# Patient Record
Sex: MALE | Race: WHITE | NOT HISPANIC OR LATINO | URBAN - NONMETROPOLITAN AREA
[De-identification: names, ages, dates, MRNs, and addresses within clinical notes are randomized per-mention and may not be internally consistent; named-entity substitution may affect disease eponyms.]

---

## 2017-07-12 PROBLEM — H40.033: Noted: 2017-07-12

## 2017-07-12 PROBLEM — H25.813: Noted: 2017-07-12

## 2019-04-29 ENCOUNTER — IMPORTED ENCOUNTER (OUTPATIENT)
Dept: URBAN - NONMETROPOLITAN AREA CLINIC 1 | Facility: CLINIC | Age: 69
End: 2019-04-29

## 2019-04-29 PROCEDURE — 92014 COMPRE OPH EXAM EST PT 1/>: CPT

## 2019-04-29 NOTE — PATIENT DISCUSSION
Cataract(s)-Visually significant cataract OU .-Cataract(s) causing symptomatic impairment of visual function not correctable with a tolerable change in glasses or contact lenses lighting or non-operative means resulting in specific activity limitations and/or participation restrictions including but not limited to reading viewing television driving or meeting vocational or recreational needs. -Expectation is clearer vision and functional improvement in symptoms as well as reduced glare disability after cataract removal.-Order IOLMaster and OPD today. -Recommend  Traditional w/Toric IOL based on today's OPD testing and lifestyle questionnaire. -Discussed that will need greishabers to open the pupil enough to perform cataract surgery.-Discussed that patient does not dilate well so does not qualify for the LenSx.-All questions were answered regarding surgery including pre and post-op medications appointments activity restrictions and anesthetic usage.-The risks benefits and alternatives and special risk factors for the patient were discussed in detail including but not limited to: bleeding infection retinal detachment vitreous loss problems with the implant and possible need for additional surgery.-Although rare the possibility of complete vision loss was discussed.-The possible need for glasses post-operatively was discussed.-Order medical clearance exam based on history of HTN cholesterol-Patient elects to proceed with cataract surgery OD .  Will schedule at patient's convenience and re-evaluate OS  in the future.; 's Notes: Dr Clark Morning referral

## 2019-06-10 PROBLEM — H25.813: Noted: 2019-06-10

## 2019-06-10 PROBLEM — H40.033: Noted: 2019-06-10

## 2019-06-18 ENCOUNTER — IMPORTED ENCOUNTER (OUTPATIENT)
Dept: URBAN - NONMETROPOLITAN AREA CLINIC 1 | Facility: CLINIC | Age: 69
End: 2019-06-18

## 2019-07-25 ENCOUNTER — IMPORTED ENCOUNTER (OUTPATIENT)
Dept: URBAN - NONMETROPOLITAN AREA CLINIC 1 | Facility: CLINIC | Age: 69
End: 2019-07-25

## 2019-07-25 PROBLEM — H40.033: Noted: 2017-07-12

## 2019-07-25 PROBLEM — Z98.41: Noted: 2019-07-25

## 2019-07-25 NOTE — PATIENT DISCUSSION
s/p PC IOL OD Toric/Trad 7/24/2019-  discussed findings w/patient-  Pt doing well s/p PCIOL. -  Continue post-op gtts according to instruction sheet and sleep with eye shield over eye for 7 nights. -  Avoid bending at the waist lifting anything over 5lbs and dirty or moi environments.-  RTC as scheduled; 's Notes: MRDFE

## 2019-07-29 ENCOUNTER — IMPORTED ENCOUNTER (OUTPATIENT)
Dept: URBAN - NONMETROPOLITAN AREA CLINIC 1 | Facility: CLINIC | Age: 69
End: 2019-07-29

## 2019-07-29 PROCEDURE — 99024 POSTOP FOLLOW-UP VISIT: CPT

## 2019-07-29 PROCEDURE — 99213 OFFICE O/P EST LOW 20 MIN: CPT

## 2019-07-29 NOTE — PATIENT DISCUSSION
Cataract(s)-Visually significant cataract OS . -Cataract(s) causing symptomatic impairment of visual function not correctable with a tolerable change in glasses or contact lenses lighting or non-operative means resulting in specific activity limitations and/or participation restrictions including but not limited to reading viewing television driving or meeting vocational or recreational needs. -Expectation is clearer vision and functional improvement in symptoms as well as reduced glare disability after cataract removal.-Recommend Toric IOL   /   Trad. Lens based on previous OPD testing and lifestyle questionnaire.-All questions were answered regarding surgery including pre and post-op medications appointments activity restrictions and anesthetic usage.-The risks benefits and alternatives and special risk factors for the patient were discussed in detail including but not limited to: bleeding infection retinal detachment vitreous loss problems with the implant and possible need for additional surgery.-Although rare the possibility of complete vision loss was discussed.-The need for glasses post-operatively was discussed.-Patient elects to proceed with cataract surgery OS . Will schedule at patient's convenience. s/p PCIOL-Pt doing well at 1 week s/p PCIOL. -Continue post-op gtts according to instruction sheet.-Okay to resume usual activites and d/c eye shield.; 's Notes: DERIC

## 2019-08-08 PROBLEM — H25.812: Noted: 2019-08-08

## 2019-08-08 PROBLEM — Z98.41: Noted: 2019-08-08

## 2019-08-08 PROBLEM — H40.033: Noted: 2019-08-08

## 2019-08-08 PROBLEM — Z98.41: Noted: 2019-07-25

## 2019-08-08 PROBLEM — E78.5: Noted: 2019-08-08

## 2019-08-08 PROBLEM — Z01.818: Noted: 2019-08-08

## 2019-08-12 ENCOUNTER — IMPORTED ENCOUNTER (OUTPATIENT)
Dept: URBAN - NONMETROPOLITAN AREA CLINIC 1 | Facility: CLINIC | Age: 69
End: 2019-08-12

## 2019-08-12 PROBLEM — H40.033: Noted: 2019-08-12

## 2019-08-12 PROBLEM — Z98.41: Noted: 2019-08-12

## 2019-08-12 PROBLEM — E78.5: Noted: 2019-08-12

## 2019-08-12 PROBLEM — Z98.42: Noted: 2019-08-12

## 2019-08-12 PROCEDURE — 92136 OPHTHALMIC BIOMETRY: CPT

## 2019-08-12 PROCEDURE — V2787 ASTIGMATISM-CORRECT FUNCTION: HCPCS

## 2019-08-12 PROCEDURE — 66984 XCAPSL CTRC RMVL W/O ECP: CPT

## 2019-08-19 ENCOUNTER — IMPORTED ENCOUNTER (OUTPATIENT)
Dept: URBAN - NONMETROPOLITAN AREA CLINIC 1 | Facility: CLINIC | Age: 69
End: 2019-08-19

## 2019-08-19 NOTE — PATIENT DISCUSSION
"s/p PCIOL Toric/Trad 8/12/2019-  discussed findings w/patient ""-  Pt doing well at 1 week s/p PCIOL. -  Continue post-op gtts according to instruction sheet.-  Okay to resume usual activites and d/c eye shield. -  RTC 3 month f/u Pseudophakia OU w/DFE; 's Notes: MRDFE"

## 2019-11-12 ENCOUNTER — IMPORTED ENCOUNTER (OUTPATIENT)
Dept: URBAN - NONMETROPOLITAN AREA CLINIC 1 | Facility: CLINIC | Age: 69
End: 2019-11-12

## 2019-11-12 PROBLEM — Z96.1: Noted: 2019-11-12

## 2019-11-12 PROBLEM — E78.5: Noted: 2019-11-12

## 2019-11-12 PROBLEM — H26.493: Noted: 2019-11-12

## 2019-11-12 PROCEDURE — 99213 OFFICE O/P EST LOW 20 MIN: CPT

## 2019-11-12 NOTE — PATIENT DISCUSSION
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

## 2020-05-12 ENCOUNTER — IMPORTED ENCOUNTER (OUTPATIENT)
Dept: URBAN - NONMETROPOLITAN AREA CLINIC 1 | Facility: CLINIC | Age: 70
End: 2020-05-12

## 2020-05-12 PROBLEM — H26.493: Noted: 2020-05-12

## 2020-05-12 PROBLEM — Z96.1: Noted: 2020-05-12

## 2020-05-12 PROCEDURE — 99213 OFFICE O/P EST LOW 20 MIN: CPT

## 2020-05-12 NOTE — PATIENT DISCUSSION
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

## 2022-04-09 ASSESSMENT — VISUAL ACUITY
OS_GLARE: 20/40
OS_GLARE: 20/400
OS_SC: 20/200
OD_CC: 20/40
OS_AM: 20/20
OS_CC: 20/80-2
OD_CC: 20/20-
OS_SC: 20/25-3
OD_PAM: 20/20
OD_CC: 20/20-
OD_CC: 20/25+
OS_AM: 20/20
OD_CC: 20/25
OD_GLARE: 20/40
OS_CC: 20/20-1
OS_GLARE: 20/400
OS_CC: 20/25-2
OS_CC: 20/30
OD_GLARE: 20/400
OS_PH: 20/25
OD_SC: 20/50
OS_CC: 20/20-3
OD_CC: 20/30-2
OD_CC: 20/20-2
OD_PH: 20/25

## 2022-04-09 ASSESSMENT — TONOMETRY
OD_IOP_MMHG: 11
OD_IOP_MMHG: 18
OD_IOP_MMHG: 19
OS_IOP_MMHG: 23
OD_IOP_MMHG: 14
OD_IOP_MMHG: 20
OS_IOP_MMHG: 22
OS_IOP_MMHG: 12
OS_IOP_MMHG: 20
OS_IOP_MMHG: 19
OS_IOP_MMHG: 12